# Patient Record
(demographics unavailable — no encounter records)

---

## 2024-10-16 NOTE — HISTORY OF PRESENT ILLNESS
[ICT: _____] : Integrated Co-teaching class (Collaborative Team Teaching) [unfilled] [IEP] : Individualized Education Program [AU] : Autism [OT: ____] : Occupational Therapy [unfilled] [S-L: _____] : Speech/Language Therapy [unfilled] [Counseling: _____] : Counseling [unfilled] [Private Counseling: _____] : Private counseling [unfilled] [SST] : Social Skills Training group [Spec. Transportation] : Special Transportation: Yes [Surgery] : surgery [FreeTextEntry4] : [employer] [FreeTextEntry1] :  Approved for 2 hours CAROLE daily (6 days a week) which has started- working on executive/emotional skills (HW, studying etc) [FreeTextEntry5] :  8th grade [employer] [TWNoteComboBox1] : 9th Grade [de-identified] : Not motivated to do well in school Math grades 90s Chem and SS failing [de-identified] : Has some friends who came to school with him. Seems stuck in the past and his friends have blocked him due to his fixations. School meeting was scheduled to discuss inappropriate comments [de-identified] : Long bus ride and comes home too tired to do any HW or study [de-identified] : one with CAROLE [de-identified] : Gets mad at his sister often [de-identified] : Will get angry often  and ask mom why she gave birth to an autistic child. Wakes up in the middle of the night. [de-identified] : [employer] [facility] [employer] [Major Illness] : no major illness [Major Injury] : no major injury [Hospitalizations] : no hospitalizations [New Medications] : no new medication [New Allergies] : no new allergies [FreeTextEntry6] : s/p foot surgery

## 2024-10-16 NOTE — REASON FOR VISIT
[Follow-Up Visit] : a follow-up visit for [ADHD] : ADHD [Autism Spectrum Disorder] : autism spectrum disorder [Response to Medication] : response to medication [Progress with Services] : progress with services [Recommendation for Intervention] : recommendation for intervention [Mother] : mother [Medical records] : medical records [FreeTextEntry4] : Focalin XR 30mg in the morning and again at 12 noon Zoloft 100mg 2x a day (psychiatrist at [facility])

## 2024-10-16 NOTE — PLAN
[Med Options Discussed: _____] : - Medication options discussed [unfilled] [Continue present medication regimen _____] : - Continue present medication regimen [unfilled] [Continue IEP] : - Continue services as presently provided for in the Individualized Education Program [Home Behavior Techniques] : - Specific behavioral techniques that can be implemented at home were discussed [Other: _____] : - [unfilled] [[email or web address].[email or web address]] : - [email or web address].org - Autism Speaks [Follow-up visit (med treatment monitoring): ____] : - Follow-up visit in [unfilled]  to evaluate response to medication and monitoring of medication treatment [Teacher BRS] : - Newly completed teacher behavior rating scale(s) [Parent BRS] : - Newly completed parent behavior rating scale [IEP or IFSP] : - Copy of most recent Individualized Education Program (IEP) or Family Service Plan (IFSP) [Test reports] : - Reports of most recent psychological, educational, speech/language, PT, OT test results [Accuracy] : Accuracy and reliability of clinical impressions [Findings (To Date)] : Findings from evaluation (to date) [Clinical Basis] : Clinical basis for current diagnosis and clinical impressions [Differential Diagnosis] : Differential diagnosis [Co-Morbidities] : Clinical disorders and problem commonly associated with this child's condition (now or in the future) [Prognosis] : Prognosis [Resources] : Other available resources [CSE / IEP] : Committee on Special Education (CSE) evaluations and Individualized Education Programs (IEP) [Family Questions] : Family's questions were addressed [Diet] : Evidence-based clinical information about diet [Sleep] : The importance of sleep and strategies to ensure adequate sleep [FreeTextEntry3] : Mother just received med admin form for school nurse to start giving Focalin XR 30mg at noon

## 2024-10-16 NOTE — REVIEW OF SYSTEMS
[Wears Glasses/Contact Lenses] : wears glasses/contact lenses [Irritability] : irritability [Anxiety] : anxiety [Normal] : Hematologic/Lymphatic [de-identified] : s/p Right foot surgery

## 2024-11-05 NOTE — DISCUSSION/SUMMARY
[FreeTextEntry1] : Osteomyelitis s/p surgery continue clindamycin, cipro and cephalexin as prescribed referral made for PT f/u as arranged with ID, rheum and podiatry.

## 2024-11-05 NOTE — HISTORY OF PRESENT ILLNESS
[de-identified] : osteomyelitis [FreeTextEntry6] : Hospital follow up visit. See hospital summary below. Doing ok since discharge Taking medications as prescribed. Some loose stools noted.  No blood. has f/u appointments in the next three days for - ID, rheum, podiatry. No acute concerns.  Patient was admitted for a possible recurrent versus chronic osteomyelitis of the right foot. MRI confirmed erosion of the fifth MTP and periosteal reaction along the shaft consistent with Osteo. The effected area of the foot was surgically removed and the patient was started on 2 different antibiotics- clindamicin and ciprofloxacin. These antibiotics will be continued for 4 weeks. Patient njeeds to follow up with poditry, infectious disease and rheumatology. Appointments are scheduled. Patient should return to the hospital if he spikes a fever or has other signs concerning for systemic infection. Patient should also follow up with his primary care physician, where he can recieve a referral for PT. Patient can sly Tylenol and Ibuprofen up to every 6 hours for pain management. He also should continue to use a walker for ambulation and can bear weight on the Right foot as tolerated. Medications on discharge: cephalexin, clindamycin, ciprofloxin.

## 2024-11-06 NOTE — IMMUNIZATIONS
[Immunizations are up to date] : Immunizations are up to date [Records maintained by [facility]] : Records maintained by [facility]

## 2024-11-07 NOTE — HISTORY OF PRESENT ILLNESS
[FreeTextEntry2] : From discharge summary:  Discharge Date 27-Oct-2024 Admission Date 21-Oct-2024 09:55 Reason for Admission foot pain Hospital Course   15yo M w/ hx ASD, ADHD, anxiety a/f r/o OM. Recent admit form 9/21-9/28: s/p washout and bx of R base of 5th metatarsal on 9/25: w/u was negative, got 2 days of IV Cefazolin inpt, dc'd on keflex (pt was prescribed 4 wk worth, took only 2 wk worth). In podiatry clinic, he received a R foot XR on 10/18 that revealed progression of erosion. The Podiatry Team instructed [place] [facility] [place] for a wash out and repeat bx on 12/23. Pt states pain is "tolerable", has been taking tylenol and motrin prn at home. Pain occurs when he is walking or pressure is placed "anywhere near my right little toe". Afebrile. No change in PO/UOP. No N/V, no diarrhea. No new rash. He has a hx of ASD, anxiety (on zoloft 50mg AM and 100mg PM), and ADHD on dexmethylphenidate 30mg BID. Takes melatonin 6mg prn for "trouble sleeping". No other significant PMH/PSH. No other home meds.  No known sick contacts. No recent travel; plans to go to [place] in December. NKDA. VUTD.  ED (10/21): XR of R foot: cortical erosion of the distal 5th metatarsal. Podiatry did not want abx until after bx. Sent bld cx. ESR, CRP, CBC, and CMP wnl. Hospital Course (10/21- 10/26)  Patient arrived to the floor HDS on RA. MR R foot revealed findings highly concerning for persistent or recurrent septic arthritis/osteomyelitis centered at the fifth MTP joint, no interval improvement compared to the prior September 2024 MRI. Concern for osteomyelitis, potentially nonbacterial vs chronic recurrent, and YOLANDA. Surgery underwent 10/24 with right foot 5th metatarsal head resection and bone biopsy, closed. Intraop findings: low concern for residual bone/soft tissue infection, low concern viability. Bone biopsy obtained and culture no growth. ID followed throughout admission, abx held until biopsy obtained and Clindamycin and Ciprofloxacin started 10/25 to cover for both staph/strep/pseudomonas. Rheumatology consulted in the hospital due to concern for YOLANDA, RF negative and VELIA sent. Advised pain management pain, initially naproxen then pt started on Motrin q8. Home medications continued, Patient deemed stable for discharge from a podiatry standpoint on 10/26. Received PT pre- and post-operatively.   Interval history (11/7/2024): 14-year-old male with culture-negative acute on chronic right 5th metatarsal osteomyelitis on ciprofloxacin and clindamycin. Each dose of clindamycin and then makes him stool. No new rash; has some dry skin and itchiness under the dressings. Appetite is less than usual on a few days. Still has pain on bottom of right foot below area of incision and on incision site.  Able to partially bear weight but prefers to use walker.

## 2024-11-07 NOTE — DISCUSSION/SUMMARY
[FreeTextEntry1] : Oneyda is a 13yo M w/ hx ASD, ADHD, anxiety, recent admission 9/21-9/28: s/p washout and bx of R base of 5th metatarsal on 9/25 (negative for infection or inflammation), 10/21-10/26 s/p washout, bone biopsy and resection of R 5th metatarsal with biopsy results consistent with acute and chronic osteomyelitis and no evidence of acute infection on cultures. MRI imaging has been consistent with erosion and effusion of the right 5th metatarsal, now s/p resection. Labs notable for negative CCP, VELIA, RF, inflammatory markers, normal CBC, CMP and uric acid, but positive HLA-B27. He is currently 2 weeks into 8 weeks of treatment for osteomyelitis with clindamycin and ciprofloxacin. VSS today and his exam is notable for much improved right 5th toe edema, swelling and erythema compared to previous exams in hospital.  Diagnosis based on biopsy results is consistent with acute and chronic osteomyelitis. However, discussed that his story, without additional lesions, worsening symptoms is uncommon, especially with negative labs for infection and inflammation and bone biopsy cultures negative x2. But given biopsy results would continue to treat and complete antibiotic course for acute osteomyelitis per Infectious Disease recommendations. Also discussed the possibility of CNO/CRMO, however again it is an unusual story for this diagnosis. But to be sure he does not have any subclinical lesions would obtain whole body MRI to evaluate for other april lesions. He does have erosion and effusion of R 5th metatarsal found on MRI which is concerning for YOLANDA, especially given the chronicity of his symptoms. Did discuss that it is uncommon to have isolated arthritis solely in the toe without progressive disease or other areas of arthritis. Will continue to use motrin and tylenol for pain control in the meantime while awaiting additional imaging and other specialist input.   Plan: - Whole body MRI - auth pending, will call mom to schedule at [facility]'s when approved - Continue follow up with ID and Podiatry - Continue antibiotic therapy per ID  - Labs with ID: CBC, CMP, CRP, ESR - Continue tylenol and motrin for pain control - Will coordinate follow up once labs and imaging have resulted - Mom aware to call sooner with any questions or concerns

## 2024-11-07 NOTE — CONSULT LETTER
[Dear  ___] : Dear  [unfilled], [Consult Letter:] : I had the pleasure of evaluating your patient, [unfilled]. [Please see my note below.] : Please see my note below. [Consult Closing:] : Thank you very much for allowing me to participate in the care of this patient.  If you have any questions, please do not hesitate to contact me. [Sincerely,] : Sincerely, [FreeTextEntry3] : Lisa Christian DO, MPH Pediatric Infectious Diseases, [facility] , [facility]

## 2024-11-07 NOTE — DISCUSSION/SUMMARY
[FreeTextEntry1] : Oneyda is a 15yo M w/ hx ASD, ADHD, anxiety, recent admission 9/21-9/28: s/p washout and bx of R base of 5th metatarsal on 9/25 (negative for infection or inflammation), 10/21-10/26 s/p washout, bone biopsy and resection of R 5th metatarsal with biopsy results consistent with acute and chronic osteomyelitis and no evidence of acute infection on cultures. MRI imaging has been consistent with erosion and effusion of the right 5th metatarsal, now s/p resection. Labs notable for negative CCP, VELIA, RF, inflammatory markers, normal CBC, CMP and uric acid, but positive HLA-B27. He is currently 2 weeks into 8 weeks of treatment for osteomyelitis with clindamycin and ciprofloxacin. VSS today and his exam is notable for much improved right 5th toe edema, swelling and erythema compared to previous exams in hospital.  Diagnosis based on biopsy results is consistent with acute and chronic osteomyelitis. However, discussed that his story, without additional lesions, worsening symptoms is uncommon, especially with negative labs for infection and inflammation and bone biopsy cultures negative x2. But given biopsy results would continue to treat and complete antibiotic course for acute osteomyelitis per Infectious Disease recommendations. Also discussed the possibility of CNO/CRMO, however again it is an unusual story for this diagnosis. But to be sure he does not have any subclinical lesions would obtain whole body MRI to evaluate for other april lesions. He does have erosion and effusion of R 5th metatarsal found on MRI which is concerning for YOLANDA, especially given the chronicity of his symptoms. Did discuss that it is uncommon to have isolated arthritis solely in the toe without progressive disease or other areas of arthritis. Will continue to use motrin and tylenol for pain control in the meantime while awaiting additional imaging and other specialist input.   Plan: - Whole body MRI - auth pending, will call mom to schedule at [facility]'s when approved - Continue follow up with ID and Podiatry - Continue antibiotic therapy per ID  - Labs with ID: CBC, CMP, CRP, ESR - Continue tylenol and motrin for pain control - Will coordinate follow up once labs and imaging have resulted - Mom aware to call sooner with any questions or concerns

## 2024-11-07 NOTE — DATA REVIEWED
[FreeTextEntry1] : From 10/26 discharge summary ED (10/21): XR of R foot: cortical erosion of the distal 5th metatarsal. Podiatry did not want abx until after bx. Sent bld cx. ESR, CRP, CBC, and CMP wnl.  Hospital Course (10/21- 10/26) Patient arrived to the floor HDS on RA. MR R foot revealed findings highly concerning for persistent or recurrent septic arthritis/osteomyelitis centered at the fifth MTP joint, no interval improvement compared to the prior September 2024 MRI. Concern for osteomyelitis, potentially nonbacterial vs chronic recurrent, and YOLANDA. Surgery underwent 10/24 with right foot 5th metatarsal head resection and bone biopsy, closed. Intraop findings: low concern for residual bone/soft tissue infection, low concern viability. Bone biopsy obtained and culture no growth. ID followed throughout admission, abx held until biopsy obtained and Clindamycin and Ciprofloxacin started 10/25 to cover for both staph/strep/pseudomonas. Rheumatology consulted in the hospital due to concern for YOLANDA, RF negative and VELIA sent. Advised pain management pain, initially naproxen then pt started on Motrin q8. Home medications continued, Patient deemed stable for discharge from a podiatry standpoint on 10/26. Received PT pre- and post-operatively. Patient can follow up with Dr. Frye outpatient for podiatry, Dr Christian for ID, and Dr Edmonds for Rheumatology.   On day of discharge, VS reviewed and remained within normal limits. Child continued to tolerate PO with adequate urine output. Child remained well-appearing, with no concerning findings noted on physical exam. Care plan discussed with caregivers who endorsed understanding. Anticipatory guidance and strict return precautions discussed with caregivers in detail. Child deemed stable for discharge to home. Recommended PMD follow up in 1-2 days of discharge.

## 2024-11-07 NOTE — PHYSICAL EXAM
[PERRLA] : CHARLES [S1, S2 Present] : S1, S2 present [Cardiac Auscultation] : normal cardiac auscultation  [Peripheral Pulses] : positive peripheral pulses [Respiratory Effort] : normal respiratory effort [Clear to auscultation] : clear to auscultation [Soft] : soft [NonTender] : non tender [Non Distended] : non distended [Normal Bowel Sounds] : normal bowel sounds [No Hepatosplenomegaly] : no hepatosplenomegaly [No Abnormal Lymph Nodes Palpated] : no abnormal lymph nodes palpated [Range Of Motion] : full range of motion [Cranial nerves grossly intact] : cranial nerves grossly intact [Intact Judgement] : intact judgement  [Insight Insight] : intact insight [Acute distress] : no acute distress [Rash] : no rash [Erythematous Conjunctiva] : nonerythematous conjunctiva [Erythematous Oropharynx] : nonerythematous oropharynx [Lesions] : no lesions [Murmurs] : no murmurs [Peripheral Edema] : no peripheral edema  [Joint effusions] : no joint effusions [de-identified] : right 5th toe tender to palpation but no swelling or effusion. stitches in place with minor drainage but no erythema. mild tenderness to palaption of right lateral shin

## 2024-11-07 NOTE — END OF VISIT
[] : Fellow [FreeTextEntry3] : Jong is a 15yo M referred for acute on chronic osteomyelitis of R. 5th digit, s/p bone biopsy as noted above. Currently managed on abx (clindamycin). Pain and swelling has improved, although feels more pain when on his feet all day/walking on his foot all day. Using cane/walker to get around. No other joint symptoms, fevers, rashes. Is having diarrhea while on abx. PE: swelling improved of affected digit, able to move toes without limitation, no arthritis anywhere else, no enthesitis. Of note, Jong was HLA-B27+ on previous lab work. At this time, agree with treating acute on chronic OM with abx. CNO is on ddx, although pt's findings are an unusual location/presentation for CNO and significant erosive disease is atypical of this as well. We did recommend basic lab testing today and will order whole body MR to ensure no other affected bones at this time. Will follow-up pending insurance approval. Will discuss f/u pending completion of abx therapy and this imaging. Answered all questions. Mom verbalized understanding of this plan.

## 2024-11-07 NOTE — CONSULT LETTER
[Dear  ___] : Dear  [unfilled], [Courtesy Letter:] : I had the pleasure of seeing your patient, [unfilled], in my office today. [Please see my note below.] : Please see my note below. [Consult Closing:] : Thank you very much for allowing me to participate in the care of this patient.  If you have any questions, please do not hesitate to contact me. [Sincerely,] : Sincerely, [FreeTextEntry2] : Marta Sosa MD

## 2024-11-07 NOTE — HISTORY OF PRESENT ILLNESS
[Noncontributory] : The patient's family history was noncontributory [Limited ADLs] : able to do activities of daily living with limitations [Limited Sports] : able to participate in sports with limitations [FreeTextEntry1] : Since discharge: Still in pain 6-7/10 currently. Sometimes pain is okay, no need for cane/walker. Currently taking clindamycin and ciprofloxacin for osteomyelitis, started antibiotics on 10/25 will be on them for 2 months  Pain is currently being managed with tylenol and motrin, alternating and taking ATC Seeing podiatry and ID this week as well - ID tomorrow and Podiatry friday.  Getting diarrhea that started maybe with antibiotics, mom thinks it's because of clindamycin. is not running to the bathroom constantly.  No new pain or swelling anywhere else on body  No fevers, recent illness, respiratory symptoms, other GI symptoms, rashes, mouth sores, eye pain/redness, morning stiffness.  Has periods of time where he is able to walk at home without walker or cane (cane used at home because smaller space, hard to use walker). Mom will ask him where the cane is because he is walking normally and patient will remember he uses it but not necessarily need it. Uses elevator and walker at school, school bus has a lift

## 2024-11-07 NOTE — REVIEW OF SYSTEMS
[NI] : Endocrine [Nl] : Hematologic/Lymphatic [Joint Pains] : arthralgias [Joint Swelling] : no joint swelling

## 2024-11-07 NOTE — PHYSICAL EXAM
ID Band # [id number] verifed due to admission.    Second adult family band given to: Gustabo Wheeler    Infant security band #[id number] applied         [PERRLA] : CHARLES [S1, S2 Present] : S1, S2 present [Cardiac Auscultation] : normal cardiac auscultation  [Peripheral Pulses] : positive peripheral pulses [Respiratory Effort] : normal respiratory effort [Clear to auscultation] : clear to auscultation [Soft] : soft [Non Distended] : non distended [NonTender] : non tender [Normal Bowel Sounds] : normal bowel sounds [No Hepatosplenomegaly] : no hepatosplenomegaly [No Abnormal Lymph Nodes Palpated] : no abnormal lymph nodes palpated [Range Of Motion] : full range of motion [Cranial nerves grossly intact] : cranial nerves grossly intact [Intact Judgement] : intact judgement  [Insight Insight] : intact insight [Acute distress] : no acute distress [Rash] : no rash [Erythematous Conjunctiva] : nonerythematous conjunctiva [Erythematous Oropharynx] : nonerythematous oropharynx [Lesions] : no lesions [Murmurs] : no murmurs [Peripheral Edema] : no peripheral edema  [Joint effusions] : no joint effusions [de-identified] : right 5th toe tender to palpation but no swelling or effusion. stitches in place with minor drainage but no erythema. mild tenderness to palaption of right lateral shin

## 2024-11-07 NOTE — REASON FOR VISIT
[Follow-Up Consultation] : a follow-up consultation visit for [Mother] : mother [Osteomyelitis] : osteomyelitis

## 2025-01-31 NOTE — HISTORY OF PRESENT ILLNESS
[FreeTextEntry6] : Chief Complaint: "He's not doing well." (per mother)  History of Present Illness (HPI): Mother reports significant behavioral changes over the past two weeks including increased aggression, irritability, yelling, refusal to do homework, cursing, and pushing. These escalated behaviors have resulted in a complaint from a neighbor to building security. Oneyda reports fatigue and difficulty focusing, particularly in his chemistry class. He also complains of neck and back pain. Mother states Oneyda visited the school nurse three days in a row for these physical complaints.  Mother reports two foot surgeries in September and October of last year for a bone infection. These surgeries resulted in missing nearly three weeks of school. Upon return, Oneyda struggled to catch up in his new high school, [employer], leading to decreased academic performance and increased anxiety. He denies tutoring or parental help, expresses feelings of inadequacy ("I have a mental disability"), and has made unrealistic plans to return to [place] where extended family resides.  Past Medical History: ADHD, bug phobia.   Past Surgical History: Two foot surgeries (Sept/Oct 2024)   Medications: * Dexmethylphenidate 30mg BID * Melatonin 5-6mg qHS (prn) * Sertraline 100mg BID (discontinued abruptly approx. January 10, 2025 per mother)

## 2025-01-31 NOTE — DISCUSSION/SUMMARY
[FreeTextEntry1] : Assessment: 1. ADHD with comorbid Anxiety and possible Sertraline withdrawal symptoms 2. Academic difficulties secondary to missed school and emotional dysregulation 3. Family conflict related to Oneyda's behavioral changes 4. R/O Oppositional Defiant Disorder (ODD), Adjustment Disorder with Mixed Disturbance of Emotions and Conduct  Plan: 1. Begin Fluoxetine 10mg qHS, titrating weekly (10mg -> 20mg -> 30mg). Monitor for side effects and efficacy. Discussed potential side effects including sleepiness and alertness fluctuations with mother and patient. Advised to take medication at bedtime initially and adjust timing if sleep is affected. 2. Continue Dexmethylphenidate 30mg BID; will re-evaluate dosage after Fluoxetine titration. 3. Continue Melatonin 5-6mg qHS prn. 4. Administer PHQ-9, CARLY-7, and Boyd ADHD rating scales (to be completed this weekend and returned via email to [email or web address]). 5. Scheduled weekly telemedicine follow-up appointments to monitor medication adjustment and symptom improvement. Next appointment: February 7, 2025 at 6:00 PM.  6. Will complete and send travel accommodation form for Oneyda's IEP to address long school bus commute and its impact on academics and behavior. Mother will send the form via email. 7. Encourage Oneyda to communicate with teachers regarding academic support and accommodations. 8. Encourage Oneyda to practice self-compassion and "give himself olga" given the significant stressors he has faced recently. Discussed coping strategies and self-care with patient. 9. Continue coordinating care with Dr. Wen (developmental pediatrician).

## 2025-01-31 NOTE — DISCUSSION/SUMMARY
[FreeTextEntry1] : Assessment: 1. ADHD with comorbid Anxiety and possible Sertraline withdrawal symptoms 2. Academic difficulties secondary to missed school and emotional dysregulation 3. Family conflict related to Oneyda's behavioral changes 4. R/O Oppositional Defiant Disorder (ODD), Adjustment Disorder with Mixed Disturbance of Emotions and Conduct  Plan: 1. Begin Fluoxetine 10mg qHS, titrating weekly (10mg -> 20mg -> 30mg). Monitor for side effects and efficacy. Discussed potential side effects including sleepiness and alertness fluctuations with mother and patient. Advised to take medication at bedtime initially and adjust timing if sleep is affected. 2. Continue Dexmethylphenidate 30mg BID; will re-evaluate dosage after Fluoxetine titration. 3. Continue Melatonin 5-6mg qHS prn. 4. Administer PHQ-9, CARLY-7, and Hudson ADHD rating scales (to be completed this weekend and returned via email to [email or web address]). 5. Scheduled weekly telemedicine follow-up appointments to monitor medication adjustment and symptom improvement. Next appointment: February 7, 2025 at 6:00 PM.  6. Will complete and send travel accommodation form for Oneyda's IEP to address long school bus commute and its impact on academics and behavior. Mother will send the form via email. 7. Encourage Oneyda to communicate with teachers regarding academic support and accommodations. 8. Encourage Oneyda to practice self-compassion and "give himself ogla" given the significant stressors he has faced recently. Discussed coping strategies and self-care with patient. 9. Continue coordinating care with Dr. Wen (developmental pediatrician).

## 2025-01-31 NOTE — BEGINNING OF VISIT
[Home] : at home, [unfilled] , at the time of the visit. [Medical Office: ([facility])___] : at the medical office located in  [Verbal consent obtained from patient] : the patient, [unfilled] [Patient] : patient [Medical Records] : medical records [Mother] : mother

## 2025-01-31 NOTE — HISTORY OF PRESENT ILLNESS
[FreeTextEntry6] : Chief Complaint: "He's not doing well." (per mother)  History of Present Illness (HPI): Mother reports significant behavioral changes over the past two weeks including increased aggression, irritability, yelling, refusal to do homework, cursing, and pushing. These escalated behaviors have resulted in a complaint from a neighbor to building security. Oneyda reports fatigue and difficulty focusing, particularly in his chemistry class. He also complains of neck and back pain. Mother states Oneyda visited the school nurse three days in a row for these physical complaints.  Mother reports two foot surgeries in September and October of last year for a bone infection. These surgeries resulted in missing nearly three weeks of school. Upon return, Oneyda struggled to catch up in his new high school, [employer], leading to decreased academic performance and increased anxiety. He denies tutoring or parental help, expresses feelings of inadequacy ("I have a mental disability"), and has made unrealistic plans to return to [place] where extended family resides.  Past Medical History: ADHD, bug phobia.   Past Surgical History: Two foot surgeries (Sept/Oct 2024)   Medications: * Dexmethylphenidate 30mg BID * Melatonin 5-6mg qHS (prn) * Sertraline 100mg BID (discontinued abruptly approx. January 10, 2025 per mother)

## 2025-01-31 NOTE — BEGINNING OF VISIT
[Home] : at home, [unfilled] , at the time of the visit. [Medical Office: ([facility])___] : at the medical office located in  [Verbal consent obtained from patient] : the patient, [unfilled] [Patient] : patient [Medical Records] : medical records [Mother] : mother

## 2025-02-10 NOTE — HISTORY OF PRESENT ILLNESS
[FreeTextEntry2] : Oneyda is a 14yM who was hospitalized at [facility] from 10/21-10/27/24 for osteomyelitis of calcaneus. He had a biopsy that showed acute on chronic osteomyelitis. Cultures have been negative and he was off antibiotics at time of biopsy. Since 10/24/24 he had been on ciprofloxacin (750 mg bid) and clindamycin (300 mg tid). Clindamycin has often been taken twice a day rather than the prescribed tid. Most recent labs were 11/7/24 and showed normal values for CMP, CRP, CBC, and ESR.  F/U visit 2/10/25: He has pain on the right lateral foot only when walking upstairs more than 2 flights which is stable over the past month. He occasionally takes a Tylenol for pain. Pain gets to 6. No pain on walking or going downstairs. No drainage. He had a week off antibiotics about 1 week during mid-Jan 2025. Currently, on cipro 750 mg bid and clindamycin 300 x 2 twice a day- restarted when the L elbow became painful. Foot appears mildly swollen.  Recently, he developed swelling of left elbow. on 1/31/25 associated with pain treated with Aleve. No fever, no weight loss, no change in appetite from baseline.  Climbs 6 floors in school.  Scheduled an MRI 3/7/25 - needs sedated.

## 2025-02-10 NOTE — PHYSICAL EXAM
[Normal] : alert, oriented as age-appropriate, affect appropriate; no weakness, no facial asymmetry, moves all extremities normal gait-child older than 18 months [de-identified] : L elbow swelling and unable to fully extend, no erythema; R foot with healed surgical site over R 5th digit. Non-reproducible pain upon flexion or extension.

## 2025-02-10 NOTE — PHYSICAL EXAM
[Normal] : alert, oriented as age-appropriate, affect appropriate; no weakness, no facial asymmetry, moves all extremities normal gait-child older than 18 months [de-identified] : L elbow swelling and unable to fully extend, no erythema; R foot with healed surgical site over R 5th digit. Non-reproducible pain upon flexion or extension.

## 2025-02-10 NOTE — HISTORY OF PRESENT ILLNESS
[FreeTextEntry2] : Oneyda is a 14yM who [place] [facility] [place] for osteomyelitis of calcaneus. He had a biopsy that showed acute on chronic osteomyelitis. Cultures have been negative and he was off antibiotics at time of biopsy. Since 10/24/24 he had been on ciprofloxacin (750 mg bid) and clindamycin (300 mg tid). Clindamycin has often been taken twice a day rather than the prescribed tid. Most recent labs were 11/7/24 and showed normal values for CMP, CRP, CBC, and ESR.  F/U visit 2/10/25: He has pain on the right lateral foot only when walking upstairs more than 2 flights which is stable over the past month. He occasionally takes a Tylenol for pain. Pain gets to 6. No pain on walking or going downstairs. No drainage. He had a week off antibiotics about 1 week during mid-Jan 2025. Currently, on cipro 750 mg bid and clindamycin 300 x 2 twice a day- restarted when the L elbow became painful. Foot appears mildly swollen.  Recently, he developed swelling of left elbow. on 1/31/25 associated with pain treated with Aleve. No fever, no weight loss, no change in appetite from baseline.  Climbs 6 floors in school.  Scheduled an MRI 3/7/25 - needs sedated.

## 2025-02-10 NOTE — REASON FOR VISIT
[Follow-Up Consultation] : a follow-up consultation visit for [Osteomyelitis] : osteomyelitis [Mother] : mother

## 2025-02-20 NOTE — PHYSICAL EXAM
[PERRLA] : CHARLES [S1, S2 Present] : S1, S2 present [Cardiac Auscultation] : normal cardiac auscultation  [Peripheral Pulses] : positive peripheral pulses [Respiratory Effort] : normal respiratory effort [Clear to auscultation] : clear to auscultation [Soft] : soft [NonTender] : non tender [Non Distended] : non distended [Normal Bowel Sounds] : normal bowel sounds [No Hepatosplenomegaly] : no hepatosplenomegaly [No Abnormal Lymph Nodes Palpated] : no abnormal lymph nodes palpated [Cranial nerves grossly intact] : cranial nerves grossly intact [Intact Judgement] : intact judgement  [Insight Insight] : intact insight [de-identified] : right 5th toe tender to palpation but no swelling or effusion. stitches in place with minor drainage but no erythema. mild tenderness to palaption of right lateral shin  [Refer to Joint Diagram Below] : refer to joint diagram below [Joint effusions] : joint effusions [_______] : 5th MTP: [unfilled]  [Acute distress] : no acute distress [Rash] : no rash [Erythematous Conjunctiva] : nonerythematous conjunctiva [Erythematous Oropharynx] : nonerythematous oropharynx [Lesions] : no lesions [Murmurs] : no murmurs [Peripheral Edema] : no peripheral edema  [Range Of Motion] : limited range of motion [de-identified] : healng scar over right 5th MTP [NumbJointsActiveArthritis] : 1 [NumbJointsLimitedMotion] : 1

## 2025-02-20 NOTE — END OF VISIT
[] : Fellow [FreeTextEntry3] : Jong is a 13yo M referred for acute on chronic osteomyelitis of R. 5th digit, s/p bone biopsy as noted above. Currently managed on abx (clindamycin and ciprofloxacin). Significant improvement in R. 5th digit (foot) with resolution of pain and swelling. Now with acute L. elbow swelling, limitation ~1 week - c/f arthritis with possible new lesion given history of previous OM and rise in inflammatory markers 2/10. Discussed that CNO is still on ddx and awaiting WB MR results as discussed; of note HLA-B27+. Low suspicion for septic arthritis given lack of warmth, redness, fevers, elevated CRP - but will monitor closely. Plan to treat first line with NSAIDs at anti-inflammatory dosing. Pending MR results, may escalate therapy if needed. Verbalized understanding of plan.    [Time Spent: ___ minutes] : I have spent [unfilled] minutes of time on the encounter which excludes teaching and separately reported services.

## 2025-02-20 NOTE — CONSULT LETTER
[Dear  ___] : Dear  [unfilled], [Courtesy Letter:] : I had the pleasure of seeing your patient, [unfilled], in my office today. [Please see my note below.] : Please see my note below. [Consult Closing:] : Thank you very much for allowing me to participate in the care of this patient.  If you have any questions, please do not hesitate to contact me. [Sincerely,] : Sincerely, [FreeTextEntry2] : Marly Shen MD [address] [place] [address], [address] [place] [address]  [FreeTextEntry3] : MD Berry Singh [facility]  Pediatric Rheumatology Fellow

## 2025-02-20 NOTE — CONSULT LETTER
[Dear  ___] : Dear  [unfilled], [Courtesy Letter:] : I had the pleasure of seeing your patient, [unfilled], in my office today. [Please see my note below.] : Please see my note below. [Consult Closing:] : Thank you very much for allowing me to participate in the care of this patient.  If you have any questions, please do not hesitate to contact me. [Sincerely,] : Sincerely, [FreeTextEntry2] : Marly Shen MD [address] [place] [address], [address] [place] [address]  [FreeTextEntry3] : MD Berry Singh [facility]  Pediatric Rheumatology Fellow

## 2025-02-20 NOTE — DATA REVIEWED
[FreeTextEntry1] : 11/7/24 labs: CBC, CMP, ESR, CRP wnl   2/10/25 labs: CBC, CMP, parvovirus antibody wnl ESR 48 CRP 5

## 2025-02-20 NOTE — CONSULT LETTER
[Dear  ___] : Dear  [unfilled], [Courtesy Letter:] : I had the pleasure of seeing your patient, [unfilled], in my office today. [Please see my note below.] : Please see my note below. [Consult Closing:] : Thank you very much for allowing me to participate in the care of this patient.  If you have any questions, please do not hesitate to contact me. [Sincerely,] : Sincerely, [FreeTextEntry2] : Marly Shen MD [address] [place] [address], [address] [place] [address]  [FreeTextEntry3] : MD Berry Singh [facility]  Pediatric Rheumatology Fellow

## 2025-02-20 NOTE — PHYSICAL EXAM
[PERRLA] : CHARLES [S1, S2 Present] : S1, S2 present [Cardiac Auscultation] : normal cardiac auscultation  [Peripheral Pulses] : positive peripheral pulses [Respiratory Effort] : normal respiratory effort [Clear to auscultation] : clear to auscultation [Soft] : soft [NonTender] : non tender [Non Distended] : non distended [Normal Bowel Sounds] : normal bowel sounds [No Hepatosplenomegaly] : no hepatosplenomegaly [No Abnormal Lymph Nodes Palpated] : no abnormal lymph nodes palpated [Cranial nerves grossly intact] : cranial nerves grossly intact [Intact Judgement] : intact judgement  [Insight Insight] : intact insight [de-identified] : right 5th toe tender to palpation but no swelling or effusion. stitches in place with minor drainage but no erythema. mild tenderness to palaption of right lateral shin  [Refer to Joint Diagram Below] : refer to joint diagram below [Joint effusions] : joint effusions [_______] : 5th MTP: [unfilled]  [Acute distress] : no acute distress [Rash] : no rash [Erythematous Conjunctiva] : nonerythematous conjunctiva [Erythematous Oropharynx] : nonerythematous oropharynx [Lesions] : no lesions [Murmurs] : no murmurs [Peripheral Edema] : no peripheral edema  [Range Of Motion] : limited range of motion [de-identified] : healng scar over right 5th MTP [NumbJointsActiveArthritis] : 1 [NumbJointsLimitedMotion] : 1

## 2025-02-20 NOTE — REVIEW OF SYSTEMS
[NI] : Endocrine [Nl] : Hematologic/Lymphatic [Joint Pains] : arthralgias [Joint Swelling] : joint swelling  [Fever] : no fever [Rash] : no rash [Skin Lesions] : no skin lesions [Eye Pain] : no eye pain [Redness] : no redness [Nasal Stuffiness] : no nasal congestion [Sore Throat] : no sore throat [Oral Ulcers] : no oral ulcers [Chest Pain] : no chest pain or discomfort [Palpitations] : no palpitations [Cough] : no cough [Shortness of Breath] : no shortness of breath [Vomiting] : no vomiting [Diarrhea] : no diarrhea [Abdominal Pain] : no abdominal pain [Limping] : no limping [Muscle Aches] : no muscle aches [AM Stiffness] : no am stiffness

## 2025-02-20 NOTE — HISTORY OF PRESENT ILLNESS
[Noncontributory] : The patient's family history was noncontributory [Limited ADLs] : able to do activities of daily living with limitations [Limited Sports] : able to participate in sports with limitations [FreeTextEntry1] : Since last seen: MRI whole body scheduled for 3/7/25. Previously scheduled for 2025 but authorization  2024 and was unable to be performed.   Having left elbow pain since about . No fevers. Went to  and got arm put in a sling. XR performed 2/3/25 negative for fracture, unable to fully visualized swelling/effusion because of positioning. Mom called in and I told her to do Naproxen/Aleve 2 tabs BID until seen for follow up.  Has been doing 400mg Aleve if having pain, not consistently. Not given this morning. Last given yesterday sometime. Gets a dose about every other day, not regularly. Arm really hurts, despite sling. Unsure if Aleve helps the pain. No fevers. Mom notes that he has the sling pulled very tight and is hunched over,   When on the computer for a long time the left elbow gets more swollen but then it improves. Doesn't hurt while on the computer. Right 5th toe feels better. only hurts when doing a lot of stairs (the 3rd flight).    Had stopped taking abx as prescribed for a week in January but resumed once elbow pain started. Saw ID for follow up on 2/10/25 - will continue antibiotics for total 5-6 month duration. Got a letter from ID so he could receive his lunchtime dose of clindamycin and elevator pass. This has not been done, needs refill on antibiotics.   Having lots of behavioral issues, seeing PCP regularly and has appt with Development.

## 2025-02-20 NOTE — END OF VISIT
[] : Fellow [FreeTextEntry3] : Jong is a 15yo M referred for acute on chronic osteomyelitis of R. 5th digit, s/p bone biopsy as noted above. Currently managed on abx (clindamycin and ciprofloxacin). Significant improvement in R. 5th digit (foot) with resolution of pain and swelling. Now with acute L. elbow swelling, limitation ~1 week - c/f arthritis with possible new lesion given history of previous OM and rise in inflammatory markers 2/10. Discussed that CNO is still on ddx and awaiting WB MR results as discussed; of note HLA-B27+. Low suspicion for septic arthritis given lack of warmth, redness, fevers, elevated CRP - but will monitor closely. Plan to treat first line with NSAIDs at anti-inflammatory dosing. Pending MR results, may escalate therapy if needed. Verbalized understanding of plan.    [Time Spent: ___ minutes] : I have spent [unfilled] minutes of time on the encounter which excludes teaching and separately reported services.

## 2025-02-20 NOTE — PHYSICAL EXAM
[PERRLA] : CHARLES [S1, S2 Present] : S1, S2 present [Cardiac Auscultation] : normal cardiac auscultation  [Peripheral Pulses] : positive peripheral pulses [Respiratory Effort] : normal respiratory effort [Clear to auscultation] : clear to auscultation [Soft] : soft [NonTender] : non tender [Non Distended] : non distended [Normal Bowel Sounds] : normal bowel sounds [No Hepatosplenomegaly] : no hepatosplenomegaly [No Abnormal Lymph Nodes Palpated] : no abnormal lymph nodes palpated [Cranial nerves grossly intact] : cranial nerves grossly intact [Intact Judgement] : intact judgement  [Insight Insight] : intact insight [de-identified] : right 5th toe tender to palpation but no swelling or effusion. stitches in place with minor drainage but no erythema. mild tenderness to palaption of right lateral shin  [Refer to Joint Diagram Below] : refer to joint diagram below [Joint effusions] : joint effusions [_______] : 5th MTP: [unfilled]  [Acute distress] : no acute distress [Rash] : no rash [Erythematous Conjunctiva] : nonerythematous conjunctiva [Erythematous Oropharynx] : nonerythematous oropharynx [Lesions] : no lesions [Murmurs] : no murmurs [Peripheral Edema] : no peripheral edema  [Range Of Motion] : limited range of motion [de-identified] : healng scar over right 5th MTP [NumbJointsActiveArthritis] : 1 [NumbJointsLimitedMotion] : 1

## 2025-02-20 NOTE — DISCUSSION/SUMMARY
[FreeTextEntry1] : Oneyda is a 14yoM admitted twice for right 5th toe pain and swelling since February 2024, diagnosed with acute on chronic osteomyelitis of right 5th toe in October 2024.  9/21/24 bone biopsy negative for acute inflammation or malignancy. Cultures and gram stains all negative.  Labs: CBC, CMP, CRP, ESR, vit C, LDH wnl. CCP, Lyme negative. HLA B27 positive, uric acid low 2.8.  10/24/24 bone biopsy: Bone, right fifth metatarsal, resection - Acute and chronic osteomyelitis with acute and chronic synovitis, reactive osteoblastic rimming, patchy areas of reactive woven bone and periosteal reactive changes Labs: CBC, CMP, CRP, ESR nl. RF, VELIA negative Bone biopsy cx NGTD, AFB not performed   Treatment course:  Ciprofloaxcin 750mg BID and clindamycin 300mg TID since 10/24/24, will need for 5-6 month duration   ---------------------------------------------------------------------------------------------------------------------------------------------- Oneyda has new right elbow tenderness, effusion, swelling and pain. Labs done with ID last week notable for ESR 48. Given his history of osteomyelitis and elevated inflammatory markers, concern that elbow pain and swelling are due to CNO. While awaiting MRI whole body scheduled for 3/7 advised starting scheduled naproxen 500mg BID for pain control. Will follow up once results available.   Plan:  Meds: - Continue antibiotics per ID - Naproxen 500mg twice a day scheduled for pain control, take after breakfast and dinner, do not use other NSAIDs like motrin with it, but can use tylenol if needed.   Labs: - Defer since done last week.  Imaging: - Whole Body MRI scheduled for 3/7/25  Specialists: - ID  Discussed return precautions and reasons to go to ED: fevers, worsening elbow pain/swelling, erythema of joints, other worsening symptoms. Mother expressed understanding.    Once MRI results will call mom to schedule follow up to discuss results and subsequent plan. May benefit from PT of R. ankle/foot for strenghtening pending imaging.

## 2025-02-24 NOTE — HISTORY OF PRESENT ILLNESS
[Preoperative Visit] : for a medical evaluation prior to surgery [Good] : Good [Prior Anesthesia] : Prior anesthesia [Fever] : no fever [Chills] : no chills [Runny Nose] : no runny nose [Earache] : no earache [Headache] : no headache [Cough] : no cough [Appetite] : no decrease in appetite [Nausea] : no nausea [Vomiting] : no vomiting [Diarrhea] : no diarrhea [Rash] : no rash [Dysuria] : no dysuria [Prev Anesthesia Reaction] : no previous anesthesia reaction [Diabetes] : no diabetes [Pulmonary Disease] : no pulmonary disease [Renal Disease] : no renal disease [GI Disease] : no gastrointestinal disease [Sleep Apnea] : no sleep apnea [Transfusion Reaction] : no transfusion reaction [Impaired Immunity] : no impaired immunity [Anesthesia Reaction] : no anesthesia reaction [Clotting Disorder] : no clotting disorder [Bleeding Disorder] : no bleeding disorder [Sudden Death] : no sudden death [FreeTextEntry1] : MRI [FreeTextEntry2] : 03/07/25 [de-identified] : Grey

## 2025-02-24 NOTE — PHYSICAL EXAM
[General Appearance - Well Developed] : interactive [General Appearance - Well-Appearing] : well appearing [General Appearance - In No Acute Distress] : in no acute distress [Sclera] : the conjunctiva were normal [Outer Ear] : the ears and nose were normal in appearance [Examination Of The Oral Cavity] : mucous membranes were moist and pink [Normal Appearance] : was normal in appearance [Neck Supple] : was supple [Enlarged Diffusely] : was not enlarged [Respiration, Rhythm And Depth] : normal respiratory rhythm and effort [Auscultation Breath Sounds / Voice Sounds] : clear bilateral breath sounds [Heart Rate And Rhythm] : heart rate and rhythm were normal [Heart Sounds] : normal S1 and S2 [Murmurs] : no murmurs [Bowel Sounds] : normal bowel sounds [Abdomen Soft] : soft [Abdomen Tenderness] : non-tender [Abdominal Distention] : nondistended [] : no hepato-splenomegaly [FreeTextEntry1] : mild swelling of left elbow mild tenderness on plaption [Generalized Lymph Node Enlargement] : no lymphadenopathy

## 2025-04-03 NOTE — CONSULT LETTER
[Dear  ___] : Dear  [unfilled], [Courtesy Letter:] : I had the pleasure of seeing your patient, [unfilled], in my office today. [Please see my note below.] : Please see my note below. [Consult Closing:] : Thank you very much for allowing me to participate in the care of this patient.  If you have any questions, please do not hesitate to contact me. [Sincerely,] : Sincerely, [FreeTextEntry2] : Marly Shen MD [address] [place] [address], [address] [place] [address]  [FreeTextEntry3] : MD Berry Singh [facility]  Pediatric Rheumatology Fellow

## 2025-04-03 NOTE — PHYSICAL EXAM
[PERRLA] : CHARLES [S1, S2 Present] : S1, S2 present [Cardiac Auscultation] : normal cardiac auscultation  [Peripheral Pulses] : positive peripheral pulses [Respiratory Effort] : normal respiratory effort [Clear to auscultation] : clear to auscultation [Soft] : soft [NonTender] : non tender [Non Distended] : non distended [Normal Bowel Sounds] : normal bowel sounds [No Hepatosplenomegaly] : no hepatosplenomegaly [No Abnormal Lymph Nodes Palpated] : no abnormal lymph nodes palpated [Refer to Joint Diagram Below] : refer to joint diagram below [Joint effusions] : joint effusions [Cranial nerves grossly intact] : cranial nerves grossly intact [Intact Judgement] : intact judgement  [Insight Insight] : intact insight [_______] : 5th MTP: [unfilled]  [Not Examined] : not examined [Acute distress] : no acute distress [Rash] : no rash [Erythematous Conjunctiva] : nonerythematous conjunctiva [Erythematous Oropharynx] : nonerythematous oropharynx [Ulcers] : no ulcers [Lesions] : no lesions [Murmurs] : no murmurs [Peripheral Edema] : no peripheral edema  [Range Of Motion] : limited range of motion [de-identified] : healng scar over right 5th MTP, erythema and warmth over L elbow  [NumbJointsActiveArthritis] : 1 [NumbJointsLimitedMotion] : 2

## 2025-04-03 NOTE — CONSULT LETTER
[Dear  ___] : Dear  [unfilled], [Courtesy Letter:] : I had the pleasure of seeing your patient, [unfilled], in my office today. [Please see my note below.] : Please see my note below. [Consult Closing:] : Thank you very much for allowing me to participate in the care of this patient.  If you have any questions, please do not hesitate to contact me. [Sincerely,] : Sincerely, [FreeTextEntry2] : Marly Shen MD [address] [place] [address], [address] [place] [address]  [FreeTextEntry3] : MD Berry Singh [facility]  Pediatric Rheumatology Fellow

## 2025-04-03 NOTE — REVIEW OF SYSTEMS
[NI] : Endocrine [Nl] : Hematologic/Lymphatic [Joint Pains] : arthralgias [Joint Swelling] : joint swelling  [Decrease In Appetite] : decreased appetite [Fever] : no fever [Rash] : no rash [Skin Lesions] : no skin lesions [Eye Pain] : no eye pain [Redness] : no redness [Nasal Stuffiness] : no nasal congestion [Sore Throat] : no sore throat [Oral Ulcers] : no oral ulcers [Chest Pain] : no chest pain or discomfort [Palpitations] : no palpitations [Cough] : no cough [Shortness of Breath] : no shortness of breath [Vomiting] : no vomiting [Diarrhea] : no diarrhea [Abdominal Pain] : no abdominal pain [Limping] : no limping [Muscle Aches] : no muscle aches [AM Stiffness] : no am stiffness

## 2025-04-03 NOTE — HISTORY OF PRESENT ILLNESS
[Noncontributory] : The patient's family history was noncontributory [Limited ADLs] : able to do activities of daily living with limitations [Limited Sports] : able to participate in sports with limitations [FreeTextEntry1] : Since last seen: Whole body MRI performed on 3/7/25.  Left elbow effusion had gotten better with naproxen BID but they ran out 2 days ago and now it is worse. Still painful, limitation in ROM. Wearing sling but forgot it.  Left wrist also bothering him now more with pain, couldn't put shirt on himself. Wrist cracked and had a lot of pain.  No other swelling or pain.  Gets canker sores, has one now. Normal for him.  Dec appetite because of stimulant  back pain, working on posture, mom bought a back brace  Still on antibiotics, needs refill of cipro, unclear when to come off.   Having lots of behavioral issues, seeing PCP regularly and has appt with Development. Is supposed to be getting psychiatric referral for conduct disorder vs ODD.

## 2025-04-03 NOTE — REVIEW OF SYSTEMS
[NI] : Endocrine [Nl] : Hematologic/Lymphatic [Joint Pains] : arthralgias [Joint Swelling] : joint swelling  [Decrease In Appetite] : decreased appetite [Fever] : no fever [Rash] : no rash [Skin Lesions] : no skin lesions [Redness] : no redness [Eye Pain] : no eye pain [Nasal Stuffiness] : no nasal congestion [Sore Throat] : no sore throat [Oral Ulcers] : no oral ulcers [Chest Pain] : no chest pain or discomfort [Palpitations] : no palpitations [Cough] : no cough [Shortness of Breath] : no shortness of breath [Vomiting] : no vomiting [Diarrhea] : no diarrhea [Abdominal Pain] : no abdominal pain [Limping] : no limping [Muscle Aches] : no muscle aches [AM Stiffness] : no am stiffness

## 2025-04-03 NOTE — PHYSICAL EXAM
[PERRLA] : CHARLES [S1, S2 Present] : S1, S2 present [Cardiac Auscultation] : normal cardiac auscultation  [Peripheral Pulses] : positive peripheral pulses [Respiratory Effort] : normal respiratory effort [Clear to auscultation] : clear to auscultation [Soft] : soft [NonTender] : non tender [Non Distended] : non distended [Normal Bowel Sounds] : normal bowel sounds [No Hepatosplenomegaly] : no hepatosplenomegaly [No Abnormal Lymph Nodes Palpated] : no abnormal lymph nodes palpated [Refer to Joint Diagram Below] : refer to joint diagram below [Joint effusions] : joint effusions [Cranial nerves grossly intact] : cranial nerves grossly intact [Intact Judgement] : intact judgement  [Insight Insight] : intact insight [_______] : 5th MTP: [unfilled]  [Not Examined] : not examined [Acute distress] : no acute distress [Rash] : no rash [Erythematous Conjunctiva] : nonerythematous conjunctiva [Erythematous Oropharynx] : nonerythematous oropharynx [Ulcers] : no ulcers [Lesions] : no lesions [Murmurs] : no murmurs [Peripheral Edema] : no peripheral edema  [Range Of Motion] : limited range of motion [de-identified] : healng scar over right 5th MTP, erythema and warmth over L elbow  [NumbJointsActiveArthritis] : 1 [NumbJointsLimitedMotion] : 2

## 2025-04-03 NOTE — DISCUSSION/SUMMARY
[FreeTextEntry1] : Oneyda is a 14yoM admitted twice for right 5th toe pain and swelling since February 2024, diagnosed with acute on chronic osteomyelitis of right 5th toe in October 2024.  9/21/24 bone biopsy negative for acute inflammation or malignancy. Cultures and gram stains all negative.  Labs: CBC, CMP, CRP, ESR, vit C, LDH wnl. CCP, Lyme negative. HLA B27 positive, uric acid low 2.8.  10/24/24 bone biopsy: Bone, right fifth metatarsal, resection - Acute and chronic osteomyelitis with acute and chronic synovitis, reactive osteoblastic rimming, patchy areas of reactive woven bone and periosteal reactive changes Labs: CBC, CMP, CRP, ESR nl. RF, VELIA negative Bone biopsy cx NGTD, AFB not performed   Treatment course:  Ciprofloaxcin 750mg BID and clindamycin 300mg TID since 10/24/24, will need for 5-6 month duration  Naproxen 500mg BID  ---------------------------------------------------------------------------------------------------------------------------------------------- Oneyda continues with left elbow effusion, swelling, limitation and pain and now has L wrist pain and limitation though no effusion. Symptoms had improved somewhat on naproxen but ran out 2 days ago. MRI Whole body showed L elbow effusion. Discussed that this constellation of symptoms is consistent with CRMO. Given persistence of L elbow effusion and new L wrist symptoms will escalate treatment by adding methotrexate.   I discussed the addition of methotrexate.  The dose of methotrexate is given once per week.  I discussed the potential side effects of short-term nausea (if this occurs it generally lasts for less than 24 hours following the dose), fatigue and mouth sores.  These symptoms are generally mild if they occur at all and can be managed easily.  I also discussed other potential toxicities such as bone marrow suppression, liver and lung toxicities.  I discussed that this medicine suppresses the immune system, and may increase risk of infection, and I therefore stressed that if there is ever a reason to check the temperature (looks ill, doesn't feel well, etc.) that even if the temperature reading is normal, a visit at the PMDs office is required.   I also discussed the controversy regarding potential malignancies in adults that may be related to methotrexate (although there are conflicting reports regarding malignancy in adult arthritis patients on methotrexate and some report lower rates of lymphoma among patients treated with methotrexate).  These side effects are very uncommon as we use methotrexate only at anti-inflammatory doses but we monitor routinely for all side effects.  I advised them of the following precautions during treatment with methotrexate:    1) For any fever of 101F or higher or if unwell even without fever, exam by the PMD is warranted and a CBC, blood and urine culture should be performed.   2) While on methotrexate no live-virus vaccines should be given.   3) I also explained that no refills for methotrexate are given if they do not come regularly for exams and blood testing.  Overall methotrexate is very well-tolerated in children for the treatment of arthritis.  Plan:  Meds: - Continue antibiotics per ID (should be 6mo at end of 4/2025) - Naproxen 500mg twice a day scheduled for pain control, take after breakfast and dinner, do not use other NSAIDs like motrin with it, but can use tylenol if needed.  - Start methotrexate 15mg/m2 = 22.5mg (9tabs) weekly   - Start folic acid daily except on MTX days   Labs: - CBC, CMP, CRP, ESR  Imaging: - MRI Whole body: post surgical changes to R 5th MTP, small effusion of L elbow, mild edema of T6 endplate  Specialists: - ID  RTC in 4 weeks or sooner with concerns.

## 2025-04-03 NOTE — PHYSICAL EXAM
[PERRLA] : CHARLES [S1, S2 Present] : S1, S2 present [Cardiac Auscultation] : normal cardiac auscultation  [Peripheral Pulses] : positive peripheral pulses [Respiratory Effort] : normal respiratory effort [Clear to auscultation] : clear to auscultation [Soft] : soft [NonTender] : non tender [Non Distended] : non distended [Normal Bowel Sounds] : normal bowel sounds [No Hepatosplenomegaly] : no hepatosplenomegaly [No Abnormal Lymph Nodes Palpated] : no abnormal lymph nodes palpated [Refer to Joint Diagram Below] : refer to joint diagram below [Joint effusions] : joint effusions [Cranial nerves grossly intact] : cranial nerves grossly intact [Intact Judgement] : intact judgement  [Insight Insight] : intact insight [_______] : 5th MTP: [unfilled]  [Not Examined] : not examined [Acute distress] : no acute distress [Rash] : no rash [Erythematous Conjunctiva] : nonerythematous conjunctiva [Erythematous Oropharynx] : nonerythematous oropharynx [Ulcers] : no ulcers [Lesions] : no lesions [Murmurs] : no murmurs [Peripheral Edema] : no peripheral edema  [Range Of Motion] : limited range of motion [de-identified] : healng scar over right 5th MTP, erythema and warmth over L elbow  [NumbJointsActiveArthritis] : 1 [NumbJointsLimitedMotion] : 2

## 2025-04-03 NOTE — END OF VISIT
[] : Fellow [FreeTextEntry3] : I discussed this patient in a pre-clinic session with the fellow including clinical status and last set of laboratory testing results. I also saw the patient and discussed history, completed an exam and discussed the plan together with the fellow. Total time spent today included reviewing prior notes, results, and time with patient/parent. Time spent teaching and/or on separate procedures was not counted toward this total time.  Agree with fellow note above. Adjustments to A/P were made where approrpiate.  [Time Spent: ___ minutes] : I have spent [unfilled] minutes of time on the encounter which excludes teaching and separately reported services.

## 2025-04-03 NOTE — CONSULT LETTER
[Dear  ___] : Dear  [unfilled], [Courtesy Letter:] : I had the pleasure of seeing your patient, [unfilled], in my office today. [Please see my note below.] : Please see my note below. [Consult Closing:] : Thank you very much for allowing me to participate in the care of this patient.  If you have any questions, please do not hesitate to contact me. [Sincerely,] : Sincerely, [FreeTextEntry2] : Marly Shen MD [address] [place] [address], [address] [place] [address]  [FreeTextEntry3] : MD Berry Singh [facility]  Pediatric Rheumatology Fellow

## 2025-04-03 NOTE — DATA REVIEWED
[FreeTextEntry1] : 11/7/24 labs: CBC, CMP, ESR, CRP wnl   2/10/25 labs: CBC, CMP, parvovirus antibody wnl ESR 48 CRP 5  3/7/25 MRI Whole Body IMPRESSION: 1. Postsurgical changes in the right fifth metatarsal with a small residual peripherally enhancing collection 2. Marrow edema in the superior endplate of T6 with mild loss of vertebral body height 3. Small left elbow joint effusion

## 2025-04-18 NOTE — PHYSICAL EXAM
[Normal] : alert, oriented as age-appropriate, affect appropriate; no weakness, no facial asymmetry, moves all extremities normal gait-child older than 18 months [de-identified] : L elbow swelling and unable to fully extend, no erythema; R foot with healed surgical site over R 5th digit. Non-reproducible pain upon flexion or extension.

## 2025-04-18 NOTE — HISTORY OF PRESENT ILLNESS
[3] : 3/10 pain [FreeTextEntry2] : Oneyda is a 14yM who [place] [facility] [place] for osteomyelitis of calcaneus. He had a biopsy that showed acute on chronic osteomyelitis. Cultures have been negative and he was off antibiotics at time of biopsy. Since 10/24/24 he had been on ciprofloxacin (750 mg bid) and clindamycin (300 mg tid). Clindamycin has often been taken twice a day rather than the prescribed tid. Most recent labs were 11/7/24 and showed normal values for CMP, CRP, CBC, and ESR.  F/U visit 2/10/25: He has pain on the right lateral foot only when walking upstairs more than 2 flights which is stable over the past month. He occasionally takes a Tylenol for pain. Pain gets to 6. No pain on walking or going downstairs. No drainage. He had a week off antibiotics about 1 week during mid-Jan 2025. Currently, on cipro 750 mg bid and clindamycin 300 x 2 twice a day- restarted when the L elbow became painful. Foot appears mildly swollen.  Recently, he developed swelling of left elbow. on 1/31/25 elbow pain treated with Aleve. No fever, no weight loss, no change in appetite from baseline.  Climbs 6 floors in school.  Scheduled an MRI 3/7/25 - needs sedated study due to long duration of exam.  F/U visit 4/18/25: Total body MRI completed and showed R 5th metatarsal surgical changes, L elbow effusion , and mild edema of TT6 endplate. Continued pain in L elbow, some back discomfort. No pain in toe -in PT and using elevator pass at school to avoid multiple flights of stairs. Started weekly methotrexate 1 week ago.  Continues on clinda and cipro. Also, on Naproxyn.

## 2025-04-18 NOTE — CONSULT LETTER
[Dear  ___] : Dear  [unfilled], [Consult Letter:] : I had the pleasure of evaluating your patient, [unfilled]. [Please see my note below.] : Please see my note below. [Sincerely,] : Sincerely, [FreeTextEntry3] : Veronica Sampson MD Pediatric Infectious Diseases [facility] [address] [place] [address] [phone] (FAX)

## 2025-05-05 NOTE — REVIEW OF SYSTEMS
[Wears Glasses/Contact Lenses] : wears glasses/contact lenses [Joint Pains] : joint pains [Irritability] : irritability [Anxiety] : anxiety [Normal] : Hematologic/Lymphatic [de-identified] : Elbow swelling/pain s/p Right foot surgery for osteo

## 2025-05-05 NOTE — PLAN
[Med Options Discussed: _____] : - Medication options discussed [unfilled] [Change  medication regimen to: _____] : - Change  medication regimen to: [unfilled] [Continue IEP] : - Continue services as presently provided for in the Individualized Education Program [Monitor Attention] : - [unfilled]'s attention skills will need to continue to be monitored [Individual In-School Counseling] : - Individual counseling weekly with school psychologist or  [Cognitive Behavior Therapy (child)] : - Cognitive behavior therapy for child to treat anxiety [Home Behavior Techniques] : - Specific behavioral techniques that can be implemented at home were discussed [[email or web address]] : - [email or web address] - Children and Adults with Attention Deficit Hyperactivity Disorder [[email or web address]] : - [email or web address] - Autism Speaks [Follow-up visit (med treatment monitoring): ____] : - Follow-up visit in [unfilled]  to evaluate response to medication and monitoring of medication treatment [Follow-up call: ____] : - Follow-up telephone call: [unfilled]  [IEP or IFSP] : - Copy of most recent Individualized Education Program (IEP) or Family Service Plan (IFSP) [Test reports] : - Reports of most recent psychological, educational, speech/language, PT, OT test results [Accuracy] : Accuracy and reliability of clinical impressions [Findings (To Date)] : Findings from evaluation (to date) [Clinical Basis] : Clinical basis for current diagnosis and clinical impressions [Differential Diagnosis] : Differential diagnosis [Co-Morbidities] : Clinical disorders and problem commonly associated with this child's condition (now or in the future) [Prognosis] : Prognosis [Medical Consultations] : Reviewed medical consultations [Goals / Benefits] : Goals & potential benefits of treatment with medication, as well as the limitations of pharmacotherapy [Resources] : Other available resources [CSE / IEP] : Committee on Special Education (CSE) evaluations and Individualized Education Programs (IEP) [Family Questions] : Family's questions were addressed [Diet] : Evidence-based clinical information about diet [Sleep] : The importance of sleep and strategies to ensure adequate sleep [Bullying] : Importance of targeting bullying

## 2025-05-05 NOTE — HISTORY OF PRESENT ILLNESS
[Honors:_____] : Honors [unfilled] [ICT: _____] : Integrated Co-teaching class (Collaborative Team Teaching) [unfilled] [IEP] : Individualized Education Program [AU] : Autism [OT: ____] : Occupational Therapy [unfilled] [S-L: _____] : Speech/Language Therapy [unfilled] [RR: _____] : Resource room [unfilled] [Counseling: _____] : Counseling [unfilled] [Private Counseling: _____] : Private counseling [unfilled] [SST] : Social Skills Training group [Spec. Transportation] : Special Transportation: Yes [Major Illness] : major illness [Hospitalizations] : hospitalizations [New Medications] : new medications [Decreased Appetite] : decreased appetite [FreeTextEntry4] : [employer] [FreeTextEntry1] :  Approved for 2 hours CAROLE daily (6 days a week) which has started- working on executive/emotional skills (HW, studying etc) ON HOLD looking for new provider [FreeTextEntry5] : 8th grade [employer]- LE and Algebra 9th grade- Chem, Geometry [[email or web address]] : 9th Grade [de-identified] : Missed a lot of school due to hospitalization Mom reached out to Chem teachers to help him catch up but they did not reply; he failed first 2 tests and then gave up on his academics Failing 3 subjects now [de-identified] : Very few friends [de-identified] : Refusing to do HW [de-identified] : Oppositional [de-identified] : Bullied by his [place] friend group [de-identified] : Sleeps well with melatonin (6-7 hrs) [Major Injury] : no major injury [Surgery] : no surgery [New Allergies] : no new allergies

## 2025-05-05 NOTE — REVIEW OF SYSTEMS
[Wears Glasses/Contact Lenses] : wears glasses/contact lenses [Joint Pains] : joint pains [Irritability] : irritability [Anxiety] : anxiety [Normal] : Hematologic/Lymphatic [de-identified] : Elbow swelling/pain s/p Right foot surgery for osteo

## 2025-05-05 NOTE — PHYSICAL EXAM
[Normal] : awake and interactive [Easily Distracted] : easily distracted [Able to redirect] : able to redirect [Fidgets] : fidgets [Appropriate eye contact] : appropriate eye contact [de-identified] : Mild elbow swelling/tenderness

## 2025-05-05 NOTE — PLAN
[Med Options Discussed: _____] : - Medication options discussed [unfilled] [Change  medication regimen to: _____] : - Change  medication regimen to: [unfilled] [Continue IEP] : - Continue services as presently provided for in the Individualized Education Program [Monitor Attention] : - [unfilled]'s attention skills will need to continue to be monitored [Individual In-School Counseling] : - Individual counseling weekly with school psychologist or  [Cognitive Behavior Therapy (child)] : - Cognitive behavior therapy for child to treat anxiety [Home Behavior Techniques] : - Specific behavioral techniques that can be implemented at home were discussed [[email or web address]] : - [email or web address] - Children and Adults with Attention Deficit Hyperactivity Disorder [[email or web address]] : - [email or web address] - Autism Speaks [Follow-up visit (med treatment monitoring): ____] : - Follow-up visit in [unfilled]  to evaluate response to medication and monitoring of medication treatment [Follow-up call: ____] : - Follow-up telephone call: [unfilled]  [IEP or IFSP] : - Copy of most recent Individualized Education Program (IEP) or Family Service Plan (IFSP) [Test reports] : - Reports of most recent psychological, educational, speech/language, PT, OT test results [Accuracy] : Accuracy and reliability of clinical impressions [Findings (To Date)] : Findings from evaluation (to date) [Clinical Basis] : Clinical basis for current diagnosis and clinical impressions [Differential Diagnosis] : Differential diagnosis [Co-Morbidities] : Clinical disorders and problem commonly associated with this child's condition (now or in the future) [Prognosis] : Prognosis [Medical Consultations] : Reviewed medical consultations [Goals / Benefits] : Goals & potential benefits of treatment with medication, as well as the limitations of pharmacotherapy [Resources] : Other available resources [CSE / IEP] : Committee on Special Education (CSE) evaluations and Individualized Education Programs (IEP) [Family Questions] : Family's questions were addressed [Diet] : Evidence-based clinical information about diet [Sleep] : The importance of sleep and strategies to ensure adequate sleep [Bullying] : Importance of targeting bullying

## 2025-05-05 NOTE — PHYSICAL EXAM
[Normal] : awake and interactive [Easily Distracted] : easily distracted [Able to redirect] : able to redirect [Fidgets] : fidgets [Appropriate eye contact] : appropriate eye contact [de-identified] : Mild elbow swelling/tenderness

## 2025-05-05 NOTE — REASON FOR VISIT
[Follow-Up Visit] : a follow-up visit for [ADHD] : ADHD [Autism Spectrum Disorder] : autism spectrum disorder [Patient] : patient [Mother] : mother [Medical records] : medical records [Response to Medication] : response to medication [Progress with Services] : progress with services [Recommendation for Intervention] : recommendation for intervention [FreeTextEntry4] : Focalin XR 30mg at 6 am and again at 12pm Fluoxetine 30mg every morning Naproxen

## 2025-05-05 NOTE — HISTORY OF PRESENT ILLNESS
[Honors:_____] : Honors [unfilled] [ICT: _____] : Integrated Co-teaching class (Collaborative Team Teaching) [unfilled] [IEP] : Individualized Education Program [AU] : Autism [OT: ____] : Occupational Therapy [unfilled] [S-L: _____] : Speech/Language Therapy [unfilled] [RR: _____] : Resource room [unfilled] [Counseling: _____] : Counseling [unfilled] [Private Counseling: _____] : Private counseling [unfilled] [SST] : Social Skills Training group [Spec. Transportation] : Special Transportation: Yes [Major Illness] : major illness [Hospitalizations] : hospitalizations [New Medications] : new medications [Decreased Appetite] : decreased appetite [FreeTextEntry4] : [employer] [FreeTextEntry1] :  Approved for 2 hours CAROLE daily (6 days a week) which has started- working on executive/emotional skills (HW, studying etc) ON HOLD looking for new provider [FreeTextEntry5] : 8th grade [employer]- LE and Algebra 9th grade- Chem, Geometry [[email or web address]] : 9th Grade [de-identified] : Missed a lot of school due to hospitalization Mom reached out to Chem teachers to help him catch up but they did not reply; he failed first 2 tests and then gave up on his academics Failing 3 subjects now [de-identified] : Very few friends [de-identified] : Refusing to do HW [de-identified] : Oppositional [de-identified] : Bullied by his [place] friend group [de-identified] : Sleeps well with melatonin (6-7 hrs) [Major Injury] : no major injury [Surgery] : no surgery [New Allergies] : no new allergies

## 2025-05-21 NOTE — PHYSICAL EXAM
[PERRLA] : CHARLES [S1, S2 Present] : S1, S2 present [Cardiac Auscultation] : normal cardiac auscultation  [Peripheral Pulses] : positive peripheral pulses [Respiratory Effort] : normal respiratory effort [Clear to auscultation] : clear to auscultation [Soft] : soft [NonTender] : non tender [Non Distended] : non distended [Normal Bowel Sounds] : normal bowel sounds [No Hepatosplenomegaly] : no hepatosplenomegaly [No Abnormal Lymph Nodes Palpated] : no abnormal lymph nodes palpated [Refer to Joint Diagram Below] : refer to joint diagram below [Joint effusions] : joint effusions [Cranial nerves grossly intact] : cranial nerves grossly intact [Intact Judgement] : intact judgement  [Insight Insight] : intact insight [Not Examined] : not examined [_______] : 5th MTP: [unfilled]  [Acute distress] : no acute distress [Rash] : no rash [Erythematous Conjunctiva] : nonerythematous conjunctiva [Erythematous Oropharynx] : nonerythematous oropharynx [Ulcers] : no ulcers [Lesions] : no lesions [Murmurs] : no murmurs [Peripheral Edema] : no peripheral edema  [Range Of Motion] : limited range of motion [de-identified] : healng scar over right 5th MTP, erythema and warmth over L elbow  [NumbJointsActiveArthritis] : 1 [NumbJointsLimitedMotion] : 2

## 2025-05-21 NOTE — DATA REVIEWED
[FreeTextEntry1] : 11/7/24 labs: CBC, CMP, ESR, CRP wnl   2/10/25 labs: CBC, CMP, parvovirus antibody wnl ESR 48 CRP 5  3/7/25 MRI Whole Body IMPRESSION: 1. Postsurgical changes in the right fifth metatarsal with a small residual peripherally enhancing collection 2. Marrow edema in the superior endplate of T6 with mild loss of vertebral body height 3. Small left elbow joint effusion  4/2/25 Labs: CBC, CMP wnl ESR 33 (down) CRP 11 (up)

## 2025-05-21 NOTE — REVIEW OF SYSTEMS
[NI] : Endocrine [Nl] : Hematologic/Lymphatic [Decrease In Appetite] : decreased appetite [Joint Pains] : arthralgias [Joint Swelling] : joint swelling  [Fever] : no fever [Rash] : no rash [Skin Lesions] : no skin lesions [Eye Pain] : no eye pain [Redness] : no redness [Nasal Stuffiness] : no nasal congestion [Sore Throat] : no sore throat [Oral Ulcers] : no oral ulcers [Chest Pain] : no chest pain or discomfort [Palpitations] : no palpitations [Cough] : no cough [Shortness of Breath] : no shortness of breath [Vomiting] : no vomiting [Diarrhea] : no diarrhea [Abdominal Pain] : no abdominal pain [Limping] : no limping [Muscle Aches] : no muscle aches [AM Stiffness] : no am stiffness

## 2025-05-21 NOTE — CONSULT LETTER
[Dear  ___] : Dear  [unfilled], [Courtesy Letter:] : I had the pleasure of seeing your patient, [unfilled], in my office today. [Please see my note below.] : Please see my note below. [Consult Closing:] : Thank you very much for allowing me to participate in the care of this patient.  If you have any questions, please do not hesitate to contact me. [Sincerely,] : Sincerely, [FreeTextEntry2] : Marly Shen MD [address] [place] [address], [address] [place] [address]  [FreeTextEntry3] : MD Berry Singh [facility]  Pediatric Rheumatology Fellow

## 2025-05-21 NOTE — HISTORY OF PRESENT ILLNESS
[Noncontributory] : The patient's family history was noncontributory [Limited ADLs] : able to do activities of daily living with limitations [Limited Sports] : able to participate in sports with limitations [FreeTextEntry1] : Since last seen: Completed antibiotic course on 4/24.  Started MTX on __, tolerating __  had a fever 5/15 so MTX held. Saw PCP. Symptoms are now __. Toe is __ L elbow is  Back is   Having lots of behavioral issues, seeing PCP regularly and has appt with Development. Is supposed to be getting psychiatric referral for conduct disorder vs ODD.

## 2025-06-07 NOTE — REVIEW OF SYSTEMS
[NI] : Endocrine [Nl] : Hematologic/Lymphatic [Joint Pains] : arthralgias [Joint Swelling] : joint swelling  [Oral Ulcers] : oral ulcers [Limping] : limping [Back Pain] : ~T back pain [AM Stiffness] : am stiffness [Fever] : no fever [Wgt Loss (___ Lbs)] : no recent weight loss [Rash] : no rash [Skin Lesions] : no skin lesions [Eye Pain] : no eye pain [Redness] : no redness [Nasal Stuffiness] : no nasal congestion [Sore Throat] : no sore throat [Chest Pain] : no chest pain or discomfort [Palpitations] : no palpitations [Cough] : no cough [Shortness of Breath] : no shortness of breath [Vomiting] : no vomiting [Diarrhea] : no diarrhea [Decrease In Appetite] : no decrease in appetite [Abdominal Pain] : no abdominal pain [Muscle Aches] : no muscle aches

## 2025-06-07 NOTE — DATA REVIEWED
[FreeTextEntry1] : 3/7/25 MRI Whole Body IMPRESSION: 1. Postsurgical changes in the right fifth metatarsal with a small residual peripherally enhancing collection 2. Marrow edema in the superior endplate of T6 with mild loss of vertebral body height 3. Small left elbow joint effusion  5/21/25 labs CBC with WBC 12.98, ANC 8.74, Hb11.9, Plt 393.  CMP wnl CRP 37 (previously 11)  ESR 88 (previously 33)

## 2025-06-07 NOTE — PHYSICAL EXAM
[PERRLA] : CHARLES [S1, S2 Present] : S1, S2 present [Cardiac Auscultation] : normal cardiac auscultation  [Peripheral Pulses] : positive peripheral pulses [Respiratory Effort] : normal respiratory effort [Clear to auscultation] : clear to auscultation [Soft] : soft [NonTender] : non tender [Non Distended] : non distended [Normal Bowel Sounds] : normal bowel sounds [No Hepatosplenomegaly] : no hepatosplenomegaly [No Abnormal Lymph Nodes Palpated] : no abnormal lymph nodes palpated [Refer to Joint Diagram Below] : refer to joint diagram below [Joint effusions] : joint effusions [Cranial nerves grossly intact] : cranial nerves grossly intact [Intact Judgement] : intact judgement  [Insight Insight] : intact insight [Not Examined] : not examined [Digits] : normal digits [Gait] : normal gait [_______] : Knee: [unfilled]  [4] : 4 [Acute distress] : no acute distress [Rash] : no rash [Lesions] : no lesions [Malar Erythema] : no malar erythema [Erythematous Conjunctiva] : nonerythematous conjunctiva [Erythematous Oropharynx] : nonerythematous oropharynx [Ulcers] : no ulcers [Murmurs] : no murmurs [Peripheral Edema] : no peripheral edema  [Range Of Motion] : limited range of motion [de-identified] : healng scar over right 5th MTP,  nontender  [FreeTextEntry3] : canker sore on inner right lip, no hard palate ulcer  [FreeTextEntry5] : Heart rate with auscultation 100, normal radial pulses. Repeat heart rate 111.  [NumbJointsActiveArthritis] : 2 [NumbJointsLimitedMotion] : 2

## 2025-06-07 NOTE — CONSULT LETTER
[Dear  ___] : Dear  [unfilled], [Courtesy Letter:] : I had the pleasure of seeing your patient, [unfilled], in my office today. [Please see my note below.] : Please see my note below. [Consult Closing:] : Thank you very much for allowing me to participate in the care of this patient.  If you have any questions, please do not hesitate to contact me. [Sincerely,] : Sincerely, [FreeTextEntry2] : Marly Shen MD [address] [place] [address], [address] [place] [address]  [FreeTextEntry3] : MD Berry Singh [facility]  Pediatric Rheumatology Fellow

## 2025-06-07 NOTE — END OF VISIT
[] : Fellow [Time Spent: ___ minutes] : I have spent [unfilled] minutes of time on the encounter which excludes teaching and separately reported services. [FreeTextEntry3] : Agree with fellow as above.    I discussed this patient in a pre-clinic session with the fellow including review of clinical status, last labs, and relevant notes from other providers.  I also saw the patient and discussed history, completed an exam and discussed the treatment/management and follow-up together with the fellow.